# Patient Record
Sex: FEMALE | Race: WHITE | Employment: OTHER | URBAN - METROPOLITAN AREA
[De-identification: names, ages, dates, MRNs, and addresses within clinical notes are randomized per-mention and may not be internally consistent; named-entity substitution may affect disease eponyms.]

---

## 2017-03-15 PROBLEM — F41.8 DEPRESSION WITH ANXIETY: Status: ACTIVE | Noted: 2017-03-15

## 2017-06-14 PROCEDURE — 80076 HEPATIC FUNCTION PANEL: CPT | Performed by: INTERNAL MEDICINE

## 2017-06-14 PROCEDURE — 36415 COLL VENOUS BLD VENIPUNCTURE: CPT | Performed by: INTERNAL MEDICINE

## 2017-06-14 PROCEDURE — 80048 BASIC METABOLIC PNL TOTAL CA: CPT | Performed by: INTERNAL MEDICINE

## 2017-06-14 PROCEDURE — 80061 LIPID PANEL: CPT | Performed by: INTERNAL MEDICINE

## 2018-01-27 PROBLEM — T50.902A SUICIDE ATTEMPT BY DRUG INGESTION, INITIAL ENCOUNTER (HCC): Status: ACTIVE | Noted: 2018-01-27

## 2018-01-27 PROBLEM — F32.A DEPRESSION: Status: ACTIVE | Noted: 2018-01-27

## 2018-01-27 PROBLEM — I95.89 OTHER SPECIFIED HYPOTENSION: Status: ACTIVE | Noted: 2018-01-27

## 2018-01-27 PROBLEM — F32.A DEPRESSION, UNSPECIFIED DEPRESSION TYPE: Status: ACTIVE | Noted: 2018-01-27

## 2018-01-27 PROBLEM — T50.902A SUICIDE ATTEMPT BY DRUG INGESTION (HCC): Status: ACTIVE | Noted: 2018-01-27

## 2018-01-27 NOTE — ED INITIAL ASSESSMENT (HPI)
Pt brought in by  via triage with suicide attempt today.  states she took 30 xanax 0.5 mg and unknown amount of nitro 0.4 mg today. Pt skin pale and clammy. Pt drowsy but able to follow verbal commands. Pt mumbling words softly in triage.  \"

## 2018-01-27 NOTE — ED PROVIDER NOTES
Patient Seen in: [facility] AND [facility] Emergency Department    History   No chief complaint on file. Stated Complaint: Overdose    HPI    Patient presents to the emergency department after suicide attempt.   This morning she took 30 Xanax tablets as well a OR),  Take 25 mg by mouth. CLOPIDOGREL BISULFATE 75 MG Oral Tab,  TAKE 1 TABLET BY MOUTH ONCE DAILY   atorvastatin 40 MG Oral Tab,  Take 40 mg by mouth nightly. Cholecalciferol (VITAMIN D3) 2000 UNITS Oral Tab,  Take 2,000 Units by mouth daily.    Los face.  Eye:  No scleral icterus. Eyelids appear normal, no lesions. Cardiovascular:  Normal S1 and S2, no murmur, regular, with good peripheral perfusion. Respiratory:  Lungs clear to auscultation bilaterally with good effort.   No wheezes, ronchi, or ra separately billable procedures.         ED Course     Labs Reviewed   BASIC METABOLIC PANEL (8) - Abnormal; Notable for the following:        Result Value    BUN 21 (*)     BUN/CREA Ratio 23.6 (*)     Calculated Osmolality 297 (*)     All other components w recommend that you schedule follow up care with a primary care provider within the next three months to obtain basic health screening including reassessment of your blood pressure.       Clinical Impression:  Suicide attempt by drug ingestion, initial encou

## 2018-01-28 PROBLEM — I95.9 ARTERIAL HYPOTENSION: Status: ACTIVE | Noted: 2018-01-27

## 2018-01-28 NOTE — ED NOTES
Spoke with Willy at [address] [place] [address],[address] control case   # [id number]. Recommend 4-6 hours observation as long as patient remains stable and blood pressure is within normal limits.

## 2018-01-28 NOTE — PROGRESS NOTES
[facility]    Progress Note    Trini [facility] Patient Status:  Inpatient    1950 MRN [id number]   Location [facility] 2W/SW Attending John Dodd MD   Hosp Day # 1 PCP PHYSICIAN NONSTAFF     Subjective:     Constituti hold bp meds for now           Results:     Lab Results  Component Value Date   WBC 4.1 01/28/2018   HGB 12.0 01/28/2018   HCT 35.3 01/28/2018    01/28/2018   CREATSERUM 0.83 01/28/2018   BUN 18 01/28/2018    01/28/2018   K 3.6 01/28/2018   CL

## 2018-01-28 NOTE — H&P
[facility]    History and Physical    [facility] Patient Status:  Emergency    1950 MRN [id number]   Location [address] Attending Pacheco Engel MD   Hosp Day # 0 PCP None Pcp     Date:  2018 depressed mood. The patient is nervous/anxious. Physical Exam:   Vital Signs:  Blood pressure 96/70, pulse 63, resp. rate 16, height 5' 4\" (1.626 m), weight 150 lb (68 kg), SpO2 96 %. Nursing note and vitals reviewed.    Constitutional: She is or

## 2018-01-28 NOTE — BH LEVEL OF CARE ASSESSMENT
Level of Care Assessment Note    General Questions  Why are you here?: \"I have been under stress, horrible stress.  I just cannot take the stress of my kids and their stressful lives and I was sitting down and thinking about it and just decided to take my Attempt in past 14 days: Yes  Date of Suicide Attempt: 01/27/18  Describe Suicide Attempt in Past 14 Days: Overdosed on medications.  she stated that as she was taking them she wanted to stop but kept telling herself it has to be today I cannot go on anothe Behaviors: No  Present Self-Injurious Behaviors: No    Mental Health Symptoms  Hallucination Type: No problems reported or observed  Delusions: No problems reported or observed  Depression Symptoms: Change in energy level;Crying; Feelings of worthlessness;F you used/abused?: Denies                                                                Support for Recovery  Is your living environment a supportive place for recovery?: No     Withdrawal Symptoms  Current Withdrawal Symptoms: No ideas; Tangential  Content: Ordinary  Level of Consciousness: Drowsy  Level of Consciousness: Drowsy       Assessment Summary  Assessment Summary: Pt is a 74 y/o female who attempted suicide by overdosing on xanax and other medications.  Pt admitted to [facility] Hopelessness: Yes  7. Agitation: No  8. Psychosis: No  9. View of death: Yes  10. Severe relational or situational stressors: Yes  Patient History  11. Family/Peer Suicidal History: No  12. Poor Social Support: Yes  13.  Alcohol or Drug Abuse: No  Factors M

## 2018-01-28 NOTE — PLAN OF CARE
Problem: ANXIETY  Goal: Will report anxiety at manageable levels  INTERVENTIONS:  - Administer medication as ordered  - Teach and rehearse alternative coping skills  - Provide emotional support with 1:1 interaction with staff  Outcome: Progressing  Pt sad Educate pt/family on patient safety including physical limitations  - Instruct pt to call for assistance with activity based on assessment  - Modify environment to reduce risk of injury  - Provide assistive devices as appropriate  - Consider OT/PT consult

## 2018-01-28 NOTE — PLAN OF CARE
ANXIETY    • Will report anxiety at manageable levels Not Progressing        COPING    • Pt/Family able to verbalize concerns and demonstrate effective coping strategies Not Progressing        Patient/Family Goals    • Patient/Family Long Term Goal Not Pro

## 2018-01-29 PROBLEM — F32.2 SEVERE MAJOR DEPRESSION (HCC): Status: ACTIVE | Noted: 2018-01-29

## 2018-01-29 NOTE — SLP NOTE
Pt sleeping at time of SLP attempt. Pt reportedly did not \"sleep well\" last night per PCT in room sitting. Pt's RN without verbalized concerns for dysphagia. Pt reportedly tolerating general diet without clinical signs of aspiration.  Pt ate hamburger and

## 2018-01-29 NOTE — CONSULTS
[facility]    PATIENT'S NAME: Calli WEBSTER   ATTENDING PHYSICIAN: Sage Richardson MD   CONSULTING PHYSICIAN: Tanesha Hilliard MD   PATIENT ACCOUNT#:   [id number]    LOCATION:  [address] [place] [address] [address] RECORD #:   [id number]       DATE OF BIR the medication and switching her to something else, increased her dose of Lexapro, which she stated made her feel worse, and she felt that it probably was the main reason why her suicidal thoughts became so strong.   Her  confirmed the same informati hallucinations. She was oriented x3. She cognitively did not show any impairment. There were no delusions.   She was still tearful and sad, but she understood that she is going to be in the hospital for a few days and was willing to comply with any treat continue to take Xanax at night to decrease her anxiety at bedtime so she will be able to sleep. There is no contraindication to continue with amitriptyline 25 mg at bedtime for sleep. Dictated By Zhang Mcdermott MD  d: 01/28/2018 21:22:10  t: 01/2

## 2018-01-29 NOTE — BH PROGRESS NOTE
Behavioral Health SOAP Note  SUBJECTIVE   Patient was admitted following an overdose of Xanax and Nitro. Patient has a h/o depression and reports an increase in depressive symptoms prior to her suicide attempt on 1/27.   OBJECTIVE  Conscious/Orientation: A the patient at bedside to discuss the treatment plan and she is in agreement. She will be notifying her  who went home.   [facility] [phone] on will-call at this time and RN aware to send the original petition and certificate flagged i

## 2018-01-29 NOTE — PROGRESS NOTES
[facility]    Progress Note    Elsy Malki [facility] Patient Status:  Inpatient    1950 MRN [id number]   Location [facility] 5SW/SE Attending Tariq Arellano MD   Hosp Day # 2 PCP PHYSICIAN NONSTAFF     Subjective:no complains, , continue with 1:1 watch , monitor , possible  inpatient psych   h         Coronary artery disease involving native coronary artery of native heart with unstable angina pectoris ([facility] [address]- stable continue with home meds           Mixed hyperlipidemia- continu

## 2018-01-30 PROBLEM — T50.902A DRUG OVERDOSE, INTENTIONAL (HCC): Status: ACTIVE | Noted: 2018-01-27

## 2018-01-30 PROBLEM — E03.9 HYPOTHYROIDISM: Status: ACTIVE | Noted: 2018-01-30

## 2018-01-30 PROBLEM — F17.200 TOBACCO DEPENDENCE: Status: ACTIVE | Noted: 2018-01-30

## 2018-01-30 PROBLEM — Z95.5 H/O HEART ARTERY STENT: Status: ACTIVE | Noted: 2018-01-30

## 2018-01-31 NOTE — PROGRESS NOTES
The patient seen today for 45 minute over 50% counseling and managing treatment plan. The patient seen by Dr. Rosalee Gasca over the weekend and patient seen today for confirming this position.     The patient is an 18-year-old   female with a intentional overdose in attempt to kill herself. Patient denied any auditory or visual hallucination. Judgment insight are impaired due to the severity of depression. A/P:    Suicidal overdose  Major depressive disorder recurrent severe    1.   Focus o

## 2020-06-21 ENCOUNTER — HOSPITAL ENCOUNTER (EMERGENCY)
Facility: HOSPITAL | Age: 70
Discharge: NURSING, REHAB OR LONG-TERM CARE | End: 2020-06-21
Attending: EMERGENCY MEDICINE
Payer: MEDICARE

## 2020-06-21 ENCOUNTER — APPOINTMENT (OUTPATIENT)
Dept: CT IMAGING | Facility: HOSPITAL | Age: 70
End: 2020-06-21
Attending: EMERGENCY MEDICINE
Payer: MEDICARE

## 2020-06-21 VITALS
TEMPERATURE: 98 F | DIASTOLIC BLOOD PRESSURE: 58 MMHG | OXYGEN SATURATION: 92 % | RESPIRATION RATE: 22 BRPM | HEART RATE: 96 BPM | SYSTOLIC BLOOD PRESSURE: 100 MMHG

## 2020-06-21 DIAGNOSIS — G62.9 NEUROPATHY: ICD-10-CM

## 2020-06-21 DIAGNOSIS — F32.A DEPRESSION, UNSPECIFIED DEPRESSION TYPE: Primary | ICD-10-CM

## 2020-06-21 DIAGNOSIS — R45.851 SUICIDAL IDEATION: ICD-10-CM

## 2020-06-21 PROCEDURE — 81001 URINALYSIS AUTO W/SCOPE: CPT | Performed by: EMERGENCY MEDICINE

## 2020-06-21 PROCEDURE — 80307 DRUG TEST PRSMV CHEM ANLYZR: CPT | Performed by: EMERGENCY MEDICINE

## 2020-06-21 PROCEDURE — 99285 EMERGENCY DEPT VISIT HI MDM: CPT

## 2020-06-21 PROCEDURE — 80329 ANALGESICS NON-OPIOID 1 OR 2: CPT | Performed by: EMERGENCY MEDICINE

## 2020-06-21 PROCEDURE — 85025 COMPLETE CBC W/AUTO DIFF WBC: CPT

## 2020-06-21 PROCEDURE — 93010 ELECTROCARDIOGRAM REPORT: CPT | Performed by: EMERGENCY MEDICINE

## 2020-06-21 PROCEDURE — 74177 CT ABD & PELVIS W/CONTRAST: CPT | Performed by: EMERGENCY MEDICINE

## 2020-06-21 PROCEDURE — 85025 COMPLETE CBC W/AUTO DIFF WBC: CPT | Performed by: EMERGENCY MEDICINE

## 2020-06-21 PROCEDURE — 80320 DRUG SCREEN QUANTALCOHOLS: CPT | Performed by: EMERGENCY MEDICINE

## 2020-06-21 PROCEDURE — 80048 BASIC METABOLIC PNL TOTAL CA: CPT | Performed by: EMERGENCY MEDICINE

## 2020-06-21 PROCEDURE — 93005 ELECTROCARDIOGRAM TRACING: CPT

## 2020-06-21 PROCEDURE — 96374 THER/PROPH/DIAG INJ IV PUSH: CPT

## 2020-06-21 RX ORDER — KETOROLAC TROMETHAMINE 15 MG/ML
15 INJECTION, SOLUTION INTRAMUSCULAR; INTRAVENOUS ONCE
Status: COMPLETED | OUTPATIENT
Start: 2020-06-21 | End: 2020-06-21

## 2020-06-21 NOTE — ED NOTES
sp02 on room air was 91-93%. 2L nasal cannula applied and sp02 increased to 95%.   No respiratory distress

## 2020-06-21 NOTE — ED NOTES
Poison Control called and spoke to [facility]. Per kaelyn, six hour observation for benzodiazepine ingestion/overdose. Continue cardiac monitor and provide supportive/symptomatic care.       REFERENCE NUMBER IS [id number]

## 2020-06-21 NOTE — ED NOTES
Received patient for care ED 26 from triage. Patient is awake/alert but responds slow and speech is slurred.   Patient and spouse report at 0730 this morning patient took clonazepam 1 mg  X 10 tablets because she had severe chronic leg pain and was tired o

## 2020-06-21 NOTE — ED NOTES
Straight cathed patient using sterile technique with ERT at the bedside. Patient tolerated. 300ml cloudy yellow urine drained from bladder. Urine sample sent to lab.   Patient is awake and appears more alert than on arrival.  Patient's speech is also ivana

## 2020-06-21 NOTE — ED NOTES
Juan Carlos Pakr called for update. Updated on patient's labs and condition, she will call back later for update.   Patient's spouse went home to let dog out and will return

## 2020-06-21 NOTE — ED PROVIDER NOTES
Patient Seen in: [facility] AND [facility] Emergency Department      History   Patient presents with:  Eval-P  Eval-D    Stated Complaint: per , pt took too much of her clonazepram due to leg pain    HPI    79-year-old female with history of hypertension, Current:/60   Pulse 70   Temp 97.7 °F (36.5 °C) (Oral)   Resp 20   SpO2 97%         Physical Exam      General Appearance: alert, no distress  Eyes: pupils equal and round no pallor or injection  ENT, Mouth: mucous membranes moist  Respiratory: RAINBOW DRAW GOLD   CBC W/ DIFFERENTIAL     EKG    Rate, intervals and axes as noted on EKG Report. Rate: 76  Rhythm: Sinus Rhythm  Axis: Left  Reading: Nonspecific EKG                    MDM     Lab and CT results noted.   Patient stable throughout ED s

## 2020-06-21 NOTE — ED NOTES
Patient resting laying on cart. No distress.   Respirations are equal/nonlabored/spontaneous/regular rate and rhythm

## 2020-06-22 PROBLEM — F33.9 MAJOR DEPRESSIVE DISORDER, RECURRENT EPISODE (HCC): Status: ACTIVE | Noted: 2020-06-22

## 2020-06-22 NOTE — BH LEVEL OF CARE ASSESSMENT
Level of Care Assessment Note    General Questions  Why are you here?: \"I have been having a hard time, a lot of family problems. A lot of problems. I just felt like I couldn't take it anymore. \"   Precipitating Events: Patient brought to the ED by her hu days): Yes  2. Have you actually had any thoughts of killing yourself? (past 30 days): Yes  3. Have you been thinking about how you might kill yourself? (past 30 days): Yes  4.  Have you had these thoughts and had some intention of acting on them? (past 30 Health Symptoms  Hallucination Type: No problems reported or observed  Delusions: No problems reported or observed  Depression Symptoms: Feelings of helplessness; Feelings of hopelessess  Depression Description: patient has felt hopeless and helpless due to Functional Impairment  Currently Attending School: No  Employment Status: Retired  Job Issues: (na)  Concerns/Conflicts with Social Relationships: Yes  Describe Concerns/Conflicts with Social Relationships[de-identified] patient reports strained relationships wit after she told her  she attempted to overdose on Clonazepam. Patient has felt hopeless and helpless due to her restless leg syndrome and leg pain. Patient has a history of prior suicide attempts, last one in 2018.  Patient is compliant with her medic

## 2020-06-22 NOTE — ED NOTES
Report given to [facility] RN of Gale Mosher. Per Sabrina IV should be taken out upon  Transport.

## 2020-06-22 NOTE — ED NOTES
Dr Alcala Favors accepts pt to rm [id number]. RN to RN x [id number]. Include original petition and certificate at transport.

## 2020-06-22 NOTE — ED NOTES
This writer attempted to meet with the patient regarding their rights and whether she would like to sign a voluntary form. Patient is very tearful and isn't sure what she should do.  Patient continuously states, \" I can't do it anymore, I want to die, this

## 2020-06-22 NOTE — ED NOTES
Patient upset and agitated asking and crying to go home. Patient's  repeatedly asking when patient will be going to [place]. Writer explained that we will try to place patient at [facility] but there may not be beds.  Patient continuing to cry, \"I just

## 2020-06-22 NOTE — ED NOTES
ASSUMED CARE TO THIS PT , RECEIVED REPORT FROM ASHLEY DIAZ. PER REPORT GIVEN PT CAME IN BECAUSE PT TOOK 10 TABLETS OF CLONAZEPAM THIS MORNING. PT HAS HISTORY OF HYPERTENSION,HYPERLIPIDEMIA,THYROID DISORDER,CAD, ANXIETY, DEPRESSION AND NEUROPATHY.   PER REPORT

## 2020-06-27 ENCOUNTER — HOSPITAL ENCOUNTER (OUTPATIENT)
Dept: GENERAL RADIOLOGY | Facility: HOSPITAL | Age: 70
Discharge: HOME | End: 2020-06-27
Attending: Other
Payer: MEDICARE

## 2020-06-27 PROCEDURE — 72100 X-RAY EXAM L-S SPINE 2/3 VWS: CPT | Performed by: OTHER

## 2020-06-27 PROCEDURE — 71046 X-RAY EXAM CHEST 2 VIEWS: CPT | Performed by: OTHER

## 2020-06-28 ENCOUNTER — ANESTHESIA EVENT (OUTPATIENT)
Dept: POSTOP/PACU | Facility: HOSPITAL | Age: 70
End: 2020-06-28
Payer: MEDICARE

## 2020-06-29 ENCOUNTER — ANESTHESIA (OUTPATIENT)
Dept: POSTOP/PACU | Facility: HOSPITAL | Age: 70
End: 2020-06-29
Payer: MEDICARE

## 2020-06-29 ENCOUNTER — HOSPITAL ENCOUNTER (OUTPATIENT)
Dept: POSTOP/PACU | Facility: HOSPITAL | Age: 70
Discharge: HOME | End: 2020-06-29
Attending: Other
Payer: MEDICARE

## 2020-06-29 VITALS
OXYGEN SATURATION: 92 % | WEIGHT: 170 LBS | TEMPERATURE: 97 F | BODY MASS INDEX: 29.02 KG/M2 | HEIGHT: 64 IN | RESPIRATION RATE: 16 BRPM | SYSTOLIC BLOOD PRESSURE: 114 MMHG | DIASTOLIC BLOOD PRESSURE: 71 MMHG | HEART RATE: 73 BPM

## 2020-06-29 DIAGNOSIS — F33.2 SEVERE EPISODE OF RECURRENT MAJOR DEPRESSIVE DISORDER, WITHOUT PSYCHOTIC FEATURES (HCC): ICD-10-CM

## 2020-06-29 RX ORDER — SODIUM CHLORIDE, SODIUM LACTATE, POTASSIUM CHLORIDE, CALCIUM CHLORIDE 600; 310; 30; 20 MG/100ML; MG/100ML; MG/100ML; MG/100ML
INJECTION, SOLUTION INTRAVENOUS CONTINUOUS
Status: DISCONTINUED | OUTPATIENT
Start: 2020-06-29 | End: 2020-06-29 | Stop reason: HOSPADM

## 2020-06-29 RX ORDER — GLYCOPYRROLATE 0.2 MG/ML
0.1 INJECTION, SOLUTION INTRAMUSCULAR; INTRAVENOUS ONCE
Status: COMPLETED | OUTPATIENT
Start: 2020-06-29 | End: 2020-06-29

## 2020-06-29 RX ORDER — NALOXONE HYDROCHLORIDE 0.4 MG/ML
80 INJECTION, SOLUTION INTRAMUSCULAR; INTRAVENOUS; SUBCUTANEOUS AS NEEDED
Status: DISCONTINUED | OUTPATIENT
Start: 2020-06-29 | End: 2020-06-29 | Stop reason: HOSPADM

## 2020-06-29 RX ORDER — KETAMINE HYDROCHLORIDE 50 MG/ML
INJECTION, SOLUTION, CONCENTRATE INTRAMUSCULAR; INTRAVENOUS AS NEEDED
Status: DISCONTINUED | OUTPATIENT
Start: 2020-06-29 | End: 2020-06-29 | Stop reason: SURG

## 2020-06-29 RX ORDER — KETOROLAC TROMETHAMINE 30 MG/ML
15 INJECTION, SOLUTION INTRAMUSCULAR; INTRAVENOUS ONCE
Status: COMPLETED | OUTPATIENT
Start: 2020-06-29 | End: 2020-06-29

## 2020-06-29 RX ORDER — KETOROLAC TROMETHAMINE 30 MG/ML
INJECTION, SOLUTION INTRAMUSCULAR; INTRAVENOUS
Status: COMPLETED
Start: 2020-06-29 | End: 2020-06-29

## 2020-06-29 RX ORDER — GLYCOPYRROLATE 0.2 MG/ML
INJECTION, SOLUTION INTRAMUSCULAR; INTRAVENOUS
Status: COMPLETED
Start: 2020-06-29 | End: 2020-06-29

## 2020-06-29 RX ORDER — ONDANSETRON 2 MG/ML
INJECTION INTRAMUSCULAR; INTRAVENOUS
Status: COMPLETED
Start: 2020-06-29 | End: 2020-06-29

## 2020-06-29 RX ORDER — HYDROMORPHONE HYDROCHLORIDE 1 MG/ML
0.4 INJECTION, SOLUTION INTRAMUSCULAR; INTRAVENOUS; SUBCUTANEOUS EVERY 5 MIN PRN
Status: DISCONTINUED | OUTPATIENT
Start: 2020-06-29 | End: 2020-06-29 | Stop reason: HOSPADM

## 2020-06-29 RX ORDER — ONDANSETRON 2 MG/ML
4 INJECTION INTRAMUSCULAR; INTRAVENOUS ONCE
Status: COMPLETED | OUTPATIENT
Start: 2020-06-29 | End: 2020-06-29

## 2020-06-29 RX ORDER — ETOMIDATE 2 MG/ML
INJECTION INTRAVENOUS AS NEEDED
Status: DISCONTINUED | OUTPATIENT
Start: 2020-06-29 | End: 2020-06-29 | Stop reason: SURG

## 2020-06-29 RX ORDER — SODIUM CHLORIDE, SODIUM LACTATE, POTASSIUM CHLORIDE, CALCIUM CHLORIDE 600; 310; 30; 20 MG/100ML; MG/100ML; MG/100ML; MG/100ML
INJECTION, SOLUTION INTRAVENOUS CONTINUOUS
Status: DISCONTINUED | OUTPATIENT
Start: 2020-06-29 | End: 2020-07-01

## 2020-06-29 RX ADMIN — KETAMINE HYDROCHLORIDE 50 MG: 50 INJECTION, SOLUTION, CONCENTRATE INTRAMUSCULAR; INTRAVENOUS at 07:03:00

## 2020-06-29 RX ADMIN — GLYCOPYRROLATE 0.1 MG: 0.2 INJECTION, SOLUTION INTRAMUSCULAR; INTRAVENOUS at 06:40:00

## 2020-06-29 RX ADMIN — SODIUM CHLORIDE, SODIUM LACTATE, POTASSIUM CHLORIDE, CALCIUM CHLORIDE: 600; 310; 30; 20 INJECTION, SOLUTION INTRAVENOUS at 07:15:00

## 2020-06-29 RX ADMIN — SODIUM CHLORIDE, SODIUM LACTATE, POTASSIUM CHLORIDE, CALCIUM CHLORIDE: 600; 310; 30; 20 INJECTION, SOLUTION INTRAVENOUS at 07:03:00

## 2020-06-29 RX ADMIN — ETOMIDATE 12 MG: 2 INJECTION INTRAVENOUS at 07:03:00

## 2020-06-29 RX ADMIN — KETOROLAC TROMETHAMINE 15 MG: 30 INJECTION, SOLUTION INTRAMUSCULAR; INTRAVENOUS at 06:42:00

## 2020-06-29 RX ADMIN — ONDANSETRON 4 MG: 2 INJECTION INTRAMUSCULAR; INTRAVENOUS at 06:41:00

## 2020-06-29 RX ADMIN — SODIUM CHLORIDE, SODIUM LACTATE, POTASSIUM CHLORIDE, CALCIUM CHLORIDE: 600; 310; 30; 20 INJECTION, SOLUTION INTRAVENOUS at 06:38:00

## 2020-06-29 NOTE — PROGRESS NOTES
[facility] Patient Status:  Outpatient   Age/Gender 78year old female MRN [id number]   Location [address] [facility] Attending Dharmesh Mcgregor MD   Hosp Day # 0 PCP [facility] Packs/day: 0.50        Years: 15.00        Pack years: 7.5        Quit date: 6/26/2010        Years since quitting: 10.0      Smokeless tobacco: Never Used    Substance and Sexual Activity      Alcohol use: No        Alcohol/week: 0.0 standard drinks color, texture, turgor normal, no rashes or lesions   Neurologic:   CNII-XII intact, normal strength, sensation and reflexes     throughout     Impressions & Plans: F 33.2, bifrontal ECT    I have discussed the risks and benefits and alternatives with the

## 2020-06-29 NOTE — PROGRESS NOTES
[facility] / [facility]  ECT Procedure Note    Xi Chand [facility] Patient Status:  Outpatient   Age/Gender 78year old female MRN [id number]   Location [facility] Attending Estrada Monique MD   Hosp Day # 0 PCP [facility]

## 2020-06-29 NOTE — ANESTHESIA POSTPROCEDURE EVALUATION
[facility] Judd Nation Patient Status:  Outpatient   Age/Gender 78year old female MRN [id number]   Location [address] [facility] Attending Garrett Gomez MD   Hosp Day # 0 PCP PHYSICIAN NONSTAFF       Anesthesia Post-op No

## 2020-06-30 NOTE — ANESTHESIA PREPROCEDURE EVALUATION
PRE-OP EVALUATION    Patient Name: Sage Richey [facility]    Pre-op Diagnosis: * No pre-op diagnosis entered *    * No procedures listed *    * No surgeons found in log *    Pre-op vitals reviewed.   Pulse: 73  Resp: 16  BP: 114/71  SpO2: 92 %  Body mass index is Sodium (PROTONIX) EC tab 20 mg, 20 mg, Oral, QAM AC  ibuprofen (MOTRIN) tab 400 mg, 400 mg, Oral, Q6H PRN  traMADol HCl (ULTRAM) tab 50 mg, 50 mg, Oral, Nightly    And  acetaminophen (TYLENOL) tab 325 mg, 325 mg, Oral, Nightly        Outpatient Medications Alcohol use: No      Alcohol/week: 0.0 standard drinks      Drug use: No     Available pre-op labs reviewed.   Lab Results   Component Value Date    WBC 6.3 06/21/2020    RBC 4.75 06/21/2020    HGB 14.6 06/21/2020    HCT 42.9 06/21/2020    MCV 90.3 06/21/20

## 2020-07-03 ENCOUNTER — ANESTHESIA (OUTPATIENT)
Dept: POSTOP/PACU | Facility: HOSPITAL | Age: 70
End: 2020-07-03
Payer: MEDICARE

## 2020-07-03 ENCOUNTER — ANESTHESIA EVENT (OUTPATIENT)
Dept: POSTOP/PACU | Facility: HOSPITAL | Age: 70
End: 2020-07-03
Payer: MEDICARE

## 2020-07-03 ENCOUNTER — HOSPITAL ENCOUNTER (OUTPATIENT)
Dept: POSTOP/PACU | Facility: HOSPITAL | Age: 70
Discharge: HOME | End: 2020-07-03
Attending: Other
Payer: MEDICARE

## 2020-07-03 VITALS
HEART RATE: 63 BPM | DIASTOLIC BLOOD PRESSURE: 76 MMHG | TEMPERATURE: 98 F | SYSTOLIC BLOOD PRESSURE: 125 MMHG | OXYGEN SATURATION: 95 % | RESPIRATION RATE: 17 BRPM

## 2020-07-03 VITALS — BODY MASS INDEX: 29 KG/M2 | HEIGHT: 64 IN

## 2020-07-03 DIAGNOSIS — F33.2 SEVERE EPISODE OF RECURRENT MAJOR DEPRESSIVE DISORDER, WITHOUT PSYCHOTIC FEATURES (HCC): ICD-10-CM

## 2020-07-03 LAB — SARS-COV-2 RNA RESP QL NAA+PROBE: NOT DETECTED

## 2020-07-03 RX ORDER — ONDANSETRON 2 MG/ML
4 INJECTION INTRAMUSCULAR; INTRAVENOUS ONCE
Status: COMPLETED | OUTPATIENT
Start: 2020-07-03 | End: 2020-07-03

## 2020-07-03 RX ORDER — ETOMIDATE 2 MG/ML
INJECTION INTRAVENOUS AS NEEDED
Status: DISCONTINUED | OUTPATIENT
Start: 2020-07-03 | End: 2020-07-03 | Stop reason: SURG

## 2020-07-03 RX ORDER — HYDROMORPHONE HYDROCHLORIDE 1 MG/ML
0.4 INJECTION, SOLUTION INTRAMUSCULAR; INTRAVENOUS; SUBCUTANEOUS EVERY 5 MIN PRN
Status: ACTIVE | OUTPATIENT
Start: 2020-07-03 | End: 2020-07-03

## 2020-07-03 RX ORDER — NALOXONE HYDROCHLORIDE 0.4 MG/ML
80 INJECTION, SOLUTION INTRAMUSCULAR; INTRAVENOUS; SUBCUTANEOUS AS NEEDED
Status: ACTIVE | OUTPATIENT
Start: 2020-07-03 | End: 2020-07-03

## 2020-07-03 RX ORDER — GLYCOPYRROLATE 0.2 MG/ML
INJECTION, SOLUTION INTRAMUSCULAR; INTRAVENOUS
Status: COMPLETED
Start: 2020-07-03 | End: 2020-07-03

## 2020-07-03 RX ORDER — SODIUM CHLORIDE, SODIUM LACTATE, POTASSIUM CHLORIDE, CALCIUM CHLORIDE 600; 310; 30; 20 MG/100ML; MG/100ML; MG/100ML; MG/100ML
INJECTION, SOLUTION INTRAVENOUS CONTINUOUS
Status: DISCONTINUED | OUTPATIENT
Start: 2020-07-03 | End: 2020-07-05

## 2020-07-03 RX ORDER — GLYCOPYRROLATE 0.2 MG/ML
0.1 INJECTION, SOLUTION INTRAMUSCULAR; INTRAVENOUS ONCE
Status: COMPLETED | OUTPATIENT
Start: 2020-07-03 | End: 2020-07-03

## 2020-07-03 RX ORDER — KETOROLAC TROMETHAMINE 30 MG/ML
15 INJECTION, SOLUTION INTRAMUSCULAR; INTRAVENOUS ONCE
Status: COMPLETED | OUTPATIENT
Start: 2020-07-03 | End: 2020-07-03

## 2020-07-03 RX ORDER — KETOROLAC TROMETHAMINE 30 MG/ML
INJECTION, SOLUTION INTRAMUSCULAR; INTRAVENOUS
Status: COMPLETED
Start: 2020-07-03 | End: 2020-07-03

## 2020-07-03 RX ORDER — KETAMINE HYDROCHLORIDE 50 MG/ML
INJECTION, SOLUTION, CONCENTRATE INTRAMUSCULAR; INTRAVENOUS AS NEEDED
Status: DISCONTINUED | OUTPATIENT
Start: 2020-07-03 | End: 2020-07-03 | Stop reason: SURG

## 2020-07-03 RX ORDER — ONDANSETRON 2 MG/ML
INJECTION INTRAMUSCULAR; INTRAVENOUS
Status: COMPLETED
Start: 2020-07-03 | End: 2020-07-03

## 2020-07-03 RX ADMIN — KETOROLAC TROMETHAMINE 15 MG: 30 INJECTION, SOLUTION INTRAMUSCULAR; INTRAVENOUS at 06:46:00

## 2020-07-03 RX ADMIN — GLYCOPYRROLATE 0.1 MG: 0.2 INJECTION, SOLUTION INTRAMUSCULAR; INTRAVENOUS at 06:46:00

## 2020-07-03 RX ADMIN — SODIUM CHLORIDE, SODIUM LACTATE, POTASSIUM CHLORIDE, CALCIUM CHLORIDE: 600; 310; 30; 20 INJECTION, SOLUTION INTRAVENOUS at 07:45:00

## 2020-07-03 RX ADMIN — ETOMIDATE 12 MG: 2 INJECTION INTRAVENOUS at 07:23:00

## 2020-07-03 RX ADMIN — SODIUM CHLORIDE, SODIUM LACTATE, POTASSIUM CHLORIDE, CALCIUM CHLORIDE: 600; 310; 30; 20 INJECTION, SOLUTION INTRAVENOUS at 06:47:00

## 2020-07-03 RX ADMIN — KETAMINE HYDROCHLORIDE 50 MG: 50 INJECTION, SOLUTION, CONCENTRATE INTRAMUSCULAR; INTRAVENOUS at 07:23:00

## 2020-07-03 RX ADMIN — ONDANSETRON 4 MG: 2 INJECTION INTRAMUSCULAR; INTRAVENOUS at 06:46:00

## 2020-07-03 NOTE — PROGRESS NOTES
Isaac Caruso / 5 [facility] [place] Patient Status:  Outpatient   Age/Gender 78year old female MRN [id number]   Location [address] [facility] Attending Felix Narvaez MD   Hosp Day # 0 PCP [facility] bilaterally, respirations unlabored    Heart:    Regular rate and rhythm, S1 and S2 normal, no murmur, rub   or gallop   Abdomen:     Soft, non-tender, bowel sounds active all four quadrants,     no masses, no organomegaly   Extremities:   Extremities norm

## 2020-07-03 NOTE — PROGRESS NOTES
[facility] / [facility]  ECT Procedure Note    Storm [facility] Patient Status:  Outpatient   Age/Gender 78year old female MRN [id number]   Location [address] [facility] Attending Glenn Gentile MD   Hosp Day # 0 PCP [facility]

## 2020-07-03 NOTE — ANESTHESIA POSTPROCEDURE EVALUATION
[facility]    Mara Faster Patient Status:  Outpatient   Age/Gender 78year old female MRN [id number]   Location [address] [facility] Attending Glenn Gentile MD   Hosp Day # 0 PCP PHYSICIAN NONSTAFF       Anesthesia Post-op No

## 2020-07-03 NOTE — ANESTHESIA PREPROCEDURE EVALUATION
PRE-OP EVALUATION    Patient Name: Sage Richey [facility]    Pre-op Diagnosis: * No pre-op diagnosis entered *    * No procedures listed *    * No surgeons found in log *    Pre-op vitals reviewed. There is no height or weight on file to calculate BMI. recurrent major depressive disorder, without psychotic features ([facility] [address]     Major depressive disorder, recurrent episode ([facility] [address]          Past Surgical History:   Procedure Laterality Date   • CARPAL TUNNEL RELEASE Bilateral    • CHOLECYSTECTOMY     • COLONOSC patient                Present on Admission:  **None**

## 2020-07-09 ENCOUNTER — HOSPITAL ENCOUNTER (OUTPATIENT)
Dept: POSTOP/PACU | Facility: HOSPITAL | Age: 70
Discharge: HOME | End: 2020-07-09
Attending: Other
Payer: MEDICARE

## 2022-07-19 NOTE — PLAN OF CARE
Problem: Patient/Family Goals  Goal: Patient/Family Long Term Goal  Patient's Long Term Goal: \"I don't want to live anymore\"    Interventions: Monitor pt continuously, talk with her to assess why she may feel this way.   - See additional Care Plan goals f HARM  Goal: Patient will be protected from self-harm  INTERVENTIONS:  - Initiate Suicide Precautions, including constant direct observation by a care companion, sitter or RN  - Initiate consults as appropriate with Behavioral Health, [address] assess for edema, trend weights   Outcome: Progressing    Goal: Absence of cardiac arrhythmias or at baseline  INTERVENTIONS:  - Continuous cardiac monitoring, monitor vital signs, obtain 12 lead EKG if indicated  - Evaluate effectiveness of antiarrhythmic W Plasty Text: The lesion was extirpated to the level of the fat with a #15 scalpel blade.  Given the location of the defect, shape of the defect and the proximity to free margins a W-plasty was deemed most appropriate for repair.  Using a sterile surgical marker, the appropriate transposition arms of the W-plasty were drawn incorporating the defect and placing the expected incisions within the relaxed skin tension lines where possible.    The area thus outlined was incised deep to adipose tissue with a #15 scalpel blade.  The skin margins were undermined to an appropriate distance in all directions utilizing iris scissors.  The opposing transposition arms were then transposed into place in opposite direction and anchored with interrupted buried subcutaneous sutures.

## (undated) NOTE — IP AVS SNAPSHOT
[facility]            (For Outpatient Use Only) Initial Admit Date: 1/27/2018   Inpt/Obs Admit Date: Inpt: 1/27/18 / Obs: N/A   Discharge Date:    Dex Leos:  [de-identified]   MRN: [de-identified]   CSN: [id number]        ENCOUNTER  Patient Class Hospital Account Financial Class: Medicare    January 29, 2018

## (undated) NOTE — IP AVS SNAPSHOT
Patient Demographics     Address  [place] [address] [address] [address] Phone  [phone] (Home)  [phone] (Mobile) *Preferred* E-mail Address  [email or web address]      Emergency Contact(s)     Name Relation Home Work Mobile    Ryan Spouse [phone] Vitamin D3 2000 units Tabs  Next dose due:  01/30/18      Take 2,000 Units by mouth daily.                    [phone] - MAR ACTION REPORT  (last 24 hrs)    ** SITE UNKNOWN **     Order ID Medication Name Action Time Action Reason Comments    [id number] Beatrice [address] [facility] Corrina Kulkarni. [address]  [facility]  [place] [address] 04/29/14 1547 - Present            Microbiology Results (All)     Procedure Component Value Units Date/Time    Urine Culture, Routine Once [[id number]] Collected: Smoking status: Never Smoker                                                              Smokeless tobacco: Never Used                      Alcohol use: Yes           0.0 oz/week    Allergies/Medications:    Allergies:   Paxil [Paroxetine]      Other (See CO2 28 01/27/2018   GLU 95 01/27/2018   CA 9.4 01/27/2018   ALB 4.4 12/07/2017   ALKPHO 93 12/07/2017   BILT 0.80 12/07/2017   TP 7.2 12/07/2017   AST 18 12/07/2017   ALT 21 12/07/2017   ETOH 6 (H) 01/27/2018         Ekg 12-lead    Result Date: 1/27/2018 1/29/2018  5:00 PM [facility] TRANSFER IN [facility]     3/27/2018 11:00 AM Leopoldo Hal, MD [address]    6/12/2018 11:30 AM Leopoldo Hal,  [facility]